# Patient Record
Sex: MALE | Race: WHITE | NOT HISPANIC OR LATINO | Employment: FULL TIME
[De-identification: names, ages, dates, MRNs, and addresses within clinical notes are randomized per-mention and may not be internally consistent; named-entity substitution may affect disease eponyms.]

---

## 2024-01-04 ENCOUNTER — TRANSCRIBE ORDERS (OUTPATIENT)
Dept: OTHER | Age: 64
End: 2024-01-04

## 2024-01-04 DIAGNOSIS — Z98.890 S/P ARTHROSCOPY OF RIGHT SHOULDER: Primary | ICD-10-CM

## 2024-01-05 ENCOUNTER — TRANSCRIBE ORDERS (OUTPATIENT)
Dept: OTHER | Age: 64
End: 2024-01-05

## 2024-01-29 ENCOUNTER — THERAPY VISIT (OUTPATIENT)
Dept: PHYSICAL THERAPY | Facility: CLINIC | Age: 64
End: 2024-01-29
Attending: PHYSICIAN ASSISTANT
Payer: COMMERCIAL

## 2024-01-29 DIAGNOSIS — Z96.611 AFTERCARE FOLLOWING RIGHT SHOULDER JOINT REPLACEMENT SURGERY: ICD-10-CM

## 2024-01-29 DIAGNOSIS — Z96.611 PRESENCE OF RIGHT ARTIFICIAL SHOULDER JOINT: ICD-10-CM

## 2024-01-29 DIAGNOSIS — Z47.1 AFTERCARE FOLLOWING RIGHT SHOULDER JOINT REPLACEMENT SURGERY: ICD-10-CM

## 2024-01-29 DIAGNOSIS — M25.511 ACUTE PAIN OF RIGHT SHOULDER: Primary | ICD-10-CM

## 2024-01-29 PROCEDURE — 97110 THERAPEUTIC EXERCISES: CPT | Mod: GP | Performed by: PHYSICAL THERAPIST

## 2024-01-29 PROCEDURE — 97161 PT EVAL LOW COMPLEX 20 MIN: CPT | Mod: GP | Performed by: PHYSICAL THERAPIST

## 2024-01-29 NOTE — PROGRESS NOTES
PHYSICAL THERAPY EVALUATION  Type of Visit: Evaluation    See electronic medical record for Abuse and Falls Screening details.    Subjective       Presenting condition or subjective complaint: pt reports he has had pain for about 5+ years and he's been monitoring his sx with yearly injections. He opted for TSA and he had that on 12-21-23 with Dr. Dixon with a 15 deg posterior augmentation and biceps tendon resection and transplantation.  Date of onset: 12/21/23    Relevant medical history:     Dates & types of surgery: cervical fusion in 2000    Prior diagnostic imaging/testing results: MRI; CT scan; X-ray     Prior therapy history for the same diagnosis, illness or injury: No          Living Environment  Social support: With a significant other or spouse   Type of home:     Stairs to enter the home:         Ramp:     Stairs inside the home:         Help at home: None  Equipment owned:       Employment: Yes   Hobbies/Interests: model airplanes    Patient goals for therapy: pain management, use it and get back to activities    Pain assessment: See objective evaluation for additional pain details     Objective   Pain:   Location: anterior, lateral, UT and posterior  At rest: 2-3/10  With activity: 4/10  Quality: dull ache, sharp,   Frequency: Intermittent  Time of Day: worse in night and sometimes in the am  Pain is exacerbated by: reaching overhead, behind back, lying on shoulder and sleeping  Pain is relieved by: rest, ice, anti-inflammatories     Range of Motion:     AROM L shoulder: 140/130/T7/85   AAROM R shoulder: table slide flexion=82, ABD in scaption=58, ER on wall=35     Strength:  L shoulder: WNL    R shoulder: NT  Notable mechanics: R scapular medial border prominence, winging and upward translation with abduction.   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  The following significant findings have been identified: Pain, Decreased ROM/flexibility, Decreased joint mobility, and Decreased strength. These impairments interfere with their ability to perform self care tasks, work tasks, recreational activities, household chores, and driving  as compared to previous level of function.     Clinical Decision Making (Complexity):  Clinical Presentation: Stable/Uncomplicated  Clinical Presentation Rationale: based on medical and personal factors listed in PT evaluation  Clinical Decision Making (Complexity): Low complexity    PLAN OF CARE  Treatment Interventions:  Interventions: Manual Therapy, Neuromuscular Re-education, Therapeutic Activity, Therapeutic Exercise    Long Term Goals     PT Goal 1  Goal Identifier: reaching  Goal Description: improve AROM and strength to tolerate reaching to the top of an [place] cabinet without pain  Rationale: to maximize safety and independence with performance of ADLs and functional tasks;to maximize safety and independence within the home;to maximize safety and independence within the community  Target Date: 04/22/24      Frequency of Treatment: 1x/wk x 6 weeks then every other week x 6 weeks  Duration of Treatment: 12 weeks    Recommended Referrals to Other Professionals: Physical Therapy  Education Assessment:        Risks and benefits of evaluation/treatment have been explained.   Patient/Family/caregiver agrees with Plan of Care.     Evaluation Time:     PT Eval, Low Complexity Minutes (98945): 20       Signing Clinician: Grace William PT

## 2024-02-05 ENCOUNTER — THERAPY VISIT (OUTPATIENT)
Dept: PHYSICAL THERAPY | Facility: CLINIC | Age: 64
End: 2024-02-05
Attending: PHYSICIAN ASSISTANT
Payer: COMMERCIAL

## 2024-02-05 DIAGNOSIS — Z96.611 AFTERCARE FOLLOWING RIGHT SHOULDER JOINT REPLACEMENT SURGERY: ICD-10-CM

## 2024-02-05 DIAGNOSIS — Z47.1 AFTERCARE FOLLOWING RIGHT SHOULDER JOINT REPLACEMENT SURGERY: ICD-10-CM

## 2024-02-05 DIAGNOSIS — Z96.611 PRESENCE OF RIGHT ARTIFICIAL SHOULDER JOINT: ICD-10-CM

## 2024-02-05 DIAGNOSIS — M25.511 ACUTE PAIN OF RIGHT SHOULDER: Primary | ICD-10-CM

## 2024-02-05 PROCEDURE — 97110 THERAPEUTIC EXERCISES: CPT | Mod: GP | Performed by: PHYSICAL THERAPIST

## 2024-02-12 ENCOUNTER — THERAPY VISIT (OUTPATIENT)
Dept: PHYSICAL THERAPY | Facility: CLINIC | Age: 64
End: 2024-02-12
Attending: PHYSICIAN ASSISTANT
Payer: COMMERCIAL

## 2024-02-12 DIAGNOSIS — Z96.611 PRESENCE OF RIGHT ARTIFICIAL SHOULDER JOINT: ICD-10-CM

## 2024-02-12 DIAGNOSIS — Z96.611 AFTERCARE FOLLOWING RIGHT SHOULDER JOINT REPLACEMENT SURGERY: ICD-10-CM

## 2024-02-12 DIAGNOSIS — Z47.1 AFTERCARE FOLLOWING RIGHT SHOULDER JOINT REPLACEMENT SURGERY: ICD-10-CM

## 2024-02-12 DIAGNOSIS — M25.511 ACUTE PAIN OF RIGHT SHOULDER: Primary | ICD-10-CM

## 2024-02-12 PROCEDURE — 97110 THERAPEUTIC EXERCISES: CPT | Mod: GP | Performed by: PHYSICAL THERAPIST

## 2024-02-21 ENCOUNTER — THERAPY VISIT (OUTPATIENT)
Dept: PHYSICAL THERAPY | Facility: CLINIC | Age: 64
End: 2024-02-21
Payer: COMMERCIAL

## 2024-02-21 DIAGNOSIS — M25.511 ACUTE PAIN OF RIGHT SHOULDER: Primary | ICD-10-CM

## 2024-02-21 DIAGNOSIS — Z96.611 AFTERCARE FOLLOWING RIGHT SHOULDER JOINT REPLACEMENT SURGERY: ICD-10-CM

## 2024-02-21 DIAGNOSIS — Z96.611 PRESENCE OF RIGHT ARTIFICIAL SHOULDER JOINT: ICD-10-CM

## 2024-02-21 DIAGNOSIS — Z47.1 AFTERCARE FOLLOWING RIGHT SHOULDER JOINT REPLACEMENT SURGERY: ICD-10-CM

## 2024-02-21 PROCEDURE — 97110 THERAPEUTIC EXERCISES: CPT | Mod: GP | Performed by: PHYSICAL THERAPIST

## 2024-02-28 ENCOUNTER — THERAPY VISIT (OUTPATIENT)
Dept: PHYSICAL THERAPY | Facility: CLINIC | Age: 64
End: 2024-02-28
Payer: COMMERCIAL

## 2024-02-28 DIAGNOSIS — M25.511 ACUTE PAIN OF RIGHT SHOULDER: Primary | ICD-10-CM

## 2024-02-28 DIAGNOSIS — Z47.1 AFTERCARE FOLLOWING RIGHT SHOULDER JOINT REPLACEMENT SURGERY: ICD-10-CM

## 2024-02-28 DIAGNOSIS — Z96.611 AFTERCARE FOLLOWING RIGHT SHOULDER JOINT REPLACEMENT SURGERY: ICD-10-CM

## 2024-02-28 DIAGNOSIS — Z96.611 PRESENCE OF RIGHT ARTIFICIAL SHOULDER JOINT: ICD-10-CM

## 2024-02-28 PROCEDURE — 97110 THERAPEUTIC EXERCISES: CPT | Mod: GP | Performed by: PHYSICAL THERAPIST

## 2024-03-06 ENCOUNTER — THERAPY VISIT (OUTPATIENT)
Dept: PHYSICAL THERAPY | Facility: CLINIC | Age: 64
End: 2024-03-06
Payer: COMMERCIAL

## 2024-03-06 DIAGNOSIS — M25.511 ACUTE PAIN OF RIGHT SHOULDER: Primary | ICD-10-CM

## 2024-03-06 DIAGNOSIS — Z47.1 AFTERCARE FOLLOWING RIGHT SHOULDER JOINT REPLACEMENT SURGERY: ICD-10-CM

## 2024-03-06 DIAGNOSIS — Z96.611 PRESENCE OF RIGHT ARTIFICIAL SHOULDER JOINT: ICD-10-CM

## 2024-03-06 DIAGNOSIS — Z96.611 AFTERCARE FOLLOWING RIGHT SHOULDER JOINT REPLACEMENT SURGERY: ICD-10-CM

## 2024-03-06 PROCEDURE — 97112 NEUROMUSCULAR REEDUCATION: CPT | Mod: GP | Performed by: PHYSICAL THERAPIST

## 2024-03-06 PROCEDURE — 97110 THERAPEUTIC EXERCISES: CPT | Mod: GP | Performed by: PHYSICAL THERAPIST

## 2024-03-19 ENCOUNTER — THERAPY VISIT (OUTPATIENT)
Dept: PHYSICAL THERAPY | Facility: CLINIC | Age: 64
End: 2024-03-19
Payer: COMMERCIAL

## 2024-03-19 DIAGNOSIS — Z96.611 PRESENCE OF RIGHT ARTIFICIAL SHOULDER JOINT: ICD-10-CM

## 2024-03-19 DIAGNOSIS — M25.511 ACUTE PAIN OF RIGHT SHOULDER: Primary | ICD-10-CM

## 2024-03-19 DIAGNOSIS — Z96.611 AFTERCARE FOLLOWING RIGHT SHOULDER JOINT REPLACEMENT SURGERY: ICD-10-CM

## 2024-03-19 DIAGNOSIS — Z47.1 AFTERCARE FOLLOWING RIGHT SHOULDER JOINT REPLACEMENT SURGERY: ICD-10-CM

## 2024-03-19 PROCEDURE — 97110 THERAPEUTIC EXERCISES: CPT | Mod: GP | Performed by: PHYSICAL THERAPIST

## 2024-04-01 ENCOUNTER — THERAPY VISIT (OUTPATIENT)
Dept: PHYSICAL THERAPY | Facility: CLINIC | Age: 64
End: 2024-04-01
Payer: COMMERCIAL

## 2024-04-01 DIAGNOSIS — Z96.611 PRESENCE OF RIGHT ARTIFICIAL SHOULDER JOINT: ICD-10-CM

## 2024-04-01 DIAGNOSIS — Z96.611 AFTERCARE FOLLOWING RIGHT SHOULDER JOINT REPLACEMENT SURGERY: ICD-10-CM

## 2024-04-01 DIAGNOSIS — Z47.1 AFTERCARE FOLLOWING RIGHT SHOULDER JOINT REPLACEMENT SURGERY: ICD-10-CM

## 2024-04-01 DIAGNOSIS — M25.511 ACUTE PAIN OF RIGHT SHOULDER: Primary | ICD-10-CM

## 2024-04-01 PROCEDURE — 97110 THERAPEUTIC EXERCISES: CPT | Mod: GP | Performed by: PHYSICAL THERAPIST

## 2024-04-01 PROCEDURE — 97112 NEUROMUSCULAR REEDUCATION: CPT | Mod: GP | Performed by: PHYSICAL THERAPIST

## 2024-04-01 NOTE — PROGRESS NOTES
04/01/24 0500   Appointment Info   Signing clinician's name / credentials Grace Saavedra DPT, SCS   Total/Authorized Visits 8 ([facility])   Visits Used 8   Medical Diagnosis s/p R TSA with 15 deg post augmentation and biceps resection and transplantation   PT Tx Diagnosis R shoulder pain   Precautions/Limitations sees Magan on 4-4 and Destiny on 6-24   Progress Note/Certification   Onset of illness/injury or Date of Surgery 12/21/23   Therapy Frequency 1x/wk x 6 weeks then every other week x 6 weeks   Predicted Duration 12 weeks   Progress Note Completed Date 04/01/24   PT Goal 1   Goal Identifier reaching   Goal Description improve AROM and strength to tolerate reaching to the top of an [place] cabinet without pain   Rationale to maximize safety and independence with performance of ADLs and functional tasks;to maximize safety and independence within the home;to maximize safety and independence within the community   Goal Progress can reach to lower shelf of [place] cabinet   Target Date 04/22/24   Subjective Report   Subjective Report he feels like anything below his shoulder is doing well--he does feel pretty tight in the am but the PT helps to loosen up his shoulder. He is working 10-11 hours days and is not doing any heavy lifting or reaching overhead but he is moving things around below his waist with 2 hands.   Objective Measures   Objective Measures Objective Measure 1   Objective Measure 1   Objective Measure R shoulder AAROM: wand abd=125, Ext/IR=central belt line, four corner pxdlvgz=171, supine wand flexion=not measure 3/19   Details R shoulder AROM: 110/120/L4/50   Treatment Interventions (PT)   Interventions Therapeutic Procedure/Exercise;Neuromuscular Re-education   Therapeutic Procedure/Exercise   PTRx Ther Proc 1 Cervical Retraction with Overpressure   PTRx Ther Proc 1 - Details HEP   PTRx Ther Proc 2 Pendulum/Codmans   PTRx Ther Proc 2 - Details HEP   PTRx Ther Proc 3 Four Corner Stretch Flexion   PTRx Ther  "Proc 3 - Details 10 x 5\"   PTRx Ther Proc 4 Wand Shoulder Flexion Supine   PTRx Ther Proc 4 - Details x15 cues to allow more support with L arm/hand   PTRx Ther Proc 5 Wand Shoulder Abduction Standing   PTRx Ther Proc 5 - Details x15 with cues to avoid shrug try thumb up   PTRx Ther Proc 6 Wand Shoulder Internal Rotation   PTRx Ther Proc 6 - Details 15   PTRx Ther Proc 7 Supine Ceiling Punch   PTRx Ther Proc 7 - Details 1 lb x 30   PTRx Ther Proc 8 Shoulder External Rotation Sidelying   PTRx Ther Proc 8 - Details AG x 20   PTRx Ther Proc 9 Shoulder Extension in Standing   PTRx Ther Proc 9 - Details 1# x 15   PTRx Ther Proc 10 Shoulder External Rotation Sidelying   PTRx Ther Proc 10 - Details AG x 20   PTRx Ther Proc 11 Shoulder Extension in Standing   PTRx Ther Proc 11 - Details AG x 20   Neuromuscular Re-education   PTRx Neuro Re-ed 1 Shoulder Flexion   PTRx Neuro Re-ed 1 - Details NMR for scap positioning and no UT hiking   PTRx Neuro Re-ed 2 Shoulder Scaption Full Can   PTRx Neuro Re-ed 2 - Details x15 for scap NMR not to hike the shoulder   Patient Response/Progress rob well, felt tension but not pain   PTRx Neuro Re-ed 3 Shoulder Theraband Low Row/Pulldown   PTRx Neuro Re-ed 3 - Details 15 with blue band   Plan   Home program see ptrx   Updates to plan of care transitioned from supine to standing cuff and added scap stab   Plan for next session continue with RC and scap strengthening         PLAN  Continue therapy per current plan of care.  1x/wk every other week for 8 weeks then transition to 1x/month for 2 months    Beginning/End Dates of Progress Note Reporting Period:  04/01/24 to 04/01/2024    Referring Provider:  Magan Mora    "

## 2024-04-15 ENCOUNTER — THERAPY VISIT (OUTPATIENT)
Dept: PHYSICAL THERAPY | Facility: CLINIC | Age: 64
End: 2024-04-15
Payer: COMMERCIAL

## 2024-04-15 DIAGNOSIS — Z96.611 PRESENCE OF RIGHT ARTIFICIAL SHOULDER JOINT: ICD-10-CM

## 2024-04-15 DIAGNOSIS — Z47.1 AFTERCARE FOLLOWING RIGHT SHOULDER JOINT REPLACEMENT SURGERY: ICD-10-CM

## 2024-04-15 DIAGNOSIS — Z96.611 AFTERCARE FOLLOWING RIGHT SHOULDER JOINT REPLACEMENT SURGERY: ICD-10-CM

## 2024-04-15 DIAGNOSIS — M25.511 ACUTE PAIN OF RIGHT SHOULDER: Primary | ICD-10-CM

## 2024-04-15 PROCEDURE — 97110 THERAPEUTIC EXERCISES: CPT | Mod: GP | Performed by: PHYSICAL THERAPIST

## 2024-04-15 PROCEDURE — 97112 NEUROMUSCULAR REEDUCATION: CPT | Mod: GP | Performed by: PHYSICAL THERAPIST

## 2024-04-29 ENCOUNTER — THERAPY VISIT (OUTPATIENT)
Dept: PHYSICAL THERAPY | Facility: CLINIC | Age: 64
End: 2024-04-29
Payer: COMMERCIAL

## 2024-04-29 DIAGNOSIS — Z96.611 PRESENCE OF RIGHT ARTIFICIAL SHOULDER JOINT: ICD-10-CM

## 2024-04-29 DIAGNOSIS — Z96.611 AFTERCARE FOLLOWING RIGHT SHOULDER JOINT REPLACEMENT SURGERY: ICD-10-CM

## 2024-04-29 DIAGNOSIS — M25.511 ACUTE PAIN OF RIGHT SHOULDER: Primary | ICD-10-CM

## 2024-04-29 DIAGNOSIS — Z47.1 AFTERCARE FOLLOWING RIGHT SHOULDER JOINT REPLACEMENT SURGERY: ICD-10-CM

## 2024-04-29 PROCEDURE — 97110 THERAPEUTIC EXERCISES: CPT | Mod: GP | Performed by: PHYSICAL THERAPIST

## 2024-04-29 PROCEDURE — 97112 NEUROMUSCULAR REEDUCATION: CPT | Mod: GP | Performed by: PHYSICAL THERAPIST

## 2024-05-14 ENCOUNTER — THERAPY VISIT (OUTPATIENT)
Dept: PHYSICAL THERAPY | Facility: CLINIC | Age: 64
End: 2024-05-14
Payer: COMMERCIAL

## 2024-05-14 DIAGNOSIS — Z47.1 AFTERCARE FOLLOWING RIGHT SHOULDER JOINT REPLACEMENT SURGERY: ICD-10-CM

## 2024-05-14 DIAGNOSIS — Z96.611 PRESENCE OF RIGHT ARTIFICIAL SHOULDER JOINT: ICD-10-CM

## 2024-05-14 DIAGNOSIS — Z96.611 AFTERCARE FOLLOWING RIGHT SHOULDER JOINT REPLACEMENT SURGERY: ICD-10-CM

## 2024-05-14 DIAGNOSIS — M25.511 ACUTE PAIN OF RIGHT SHOULDER: Primary | ICD-10-CM

## 2024-05-14 PROCEDURE — 97112 NEUROMUSCULAR REEDUCATION: CPT | Mod: GP | Performed by: PHYSICAL THERAPIST

## 2024-05-14 PROCEDURE — 97110 THERAPEUTIC EXERCISES: CPT | Mod: GP | Performed by: PHYSICAL THERAPIST

## 2024-05-30 ENCOUNTER — THERAPY VISIT (OUTPATIENT)
Dept: PHYSICAL THERAPY | Facility: CLINIC | Age: 64
End: 2024-05-30
Payer: COMMERCIAL

## 2024-05-30 DIAGNOSIS — Z96.611 PRESENCE OF RIGHT ARTIFICIAL SHOULDER JOINT: ICD-10-CM

## 2024-05-30 DIAGNOSIS — M25.511 ACUTE PAIN OF RIGHT SHOULDER: Primary | ICD-10-CM

## 2024-05-30 DIAGNOSIS — Z96.611 AFTERCARE FOLLOWING RIGHT SHOULDER JOINT REPLACEMENT SURGERY: ICD-10-CM

## 2024-05-30 DIAGNOSIS — Z47.1 AFTERCARE FOLLOWING RIGHT SHOULDER JOINT REPLACEMENT SURGERY: ICD-10-CM

## 2024-05-30 PROCEDURE — 97112 NEUROMUSCULAR REEDUCATION: CPT | Mod: GP | Performed by: PHYSICAL THERAPIST

## 2024-05-30 PROCEDURE — 97110 THERAPEUTIC EXERCISES: CPT | Mod: GP | Performed by: PHYSICAL THERAPIST

## 2024-05-30 NOTE — PROGRESS NOTES
"   05/30/24 0500   Appointment Info   Signing clinician's name / credentials Grace Saavedra DPT, SCS   Total/Authorized Visits 8 ([facility])   Visits Used 11   Medical Diagnosis s/p R TSA with 15 deg post augmentation and biceps resection and transplantation   PT Tx Diagnosis R shoulder pain   Precautions/Limitations sees Magan on 4-4 and Destiny on 6-24   Progress Note/Certification   Onset of illness/injury or Date of Surgery 12/21/23   Therapy Frequency every other week for 12 weeks   Predicted Duration 12 weeks   Progress Note Due Date 05/30/24   Progress Note Completed Date 04/01/24   PT Goal 1   Goal Identifier reaching   Goal Description new goal to reach to top shelf of [place] cabinet with 2-3# pain free without hiking   Rationale to maximize safety and independence with performance of ADLs and functional tasks;to maximize safety and independence within the home;to maximize safety and independence within the community   Goal Progress can reach to top shelf of [place] cabinet with 2# but hikes   Target Date 06/01/24   Date Met   (nearly met, just still hikes)   Subjective Report   Subjective Report He has full motion but he could feel his arm was weak when he tried to start the lawnmower. He feels like it used to hurt and he couldn't do it but now he feels like it was just a lack of strength now.   Objective Measures   Objective Measures Objective Measure 1   Objective Measure 1   Objective Measure R shoulder strength: 3+/4/4/4   Details R shoulder AROM: 127/135//L3/56   Treatment Interventions (PT)   Interventions Therapeutic Procedure/Exercise;Neuromuscular Re-education   Therapeutic Procedure/Exercise   Therapeutic Procedures: strength, endurance, ROM, flexibility minutes (78693) 25   PTRx Ther Proc 1 Cervical Retraction with Overpressure   PTRx Ther Proc 1 - Details HEP   PTRx Ther Proc 2 Four Corner Stretch Flexion   PTRx Ther Proc 2 - Details 10 x 5\"   PTRx Ther Proc 3 Wall Slides Abduction   PTRx Ther Proc 3 - " "Details 5 and he likes this better than the ABD wand he did 10 times   PTRx Ther Proc 4 Four Corner Stretch Internal Rotation   PTRx Ther Proc 4 - Details 10\" x 5   PTRx Ther Proc 5 Shoulder External Rotation Sidelying   PTRx Ther Proc 5 - Details 1/2# x 15   PTRx Ther Proc 6 Shoulder Extension in Standing   PTRx Ther Proc 6 - Details 3# x 20   PTRx Ther Proc 7 Bicep Curls with Dumbbells   PTRx Ther Proc 7 - Details 5# x 15   PTRx Ther Proc 8 Cable Tricep Extension Standing   PTRx Ther Proc 8 - Details rev   PTRx Ther Proc 9 Bent Over Rows   PTRx Ther Proc 9 - Details 10# x 15   Skilled Intervention see above   Patient Response/Progress rob well, no issues but did better when he was able to visualize with his scap   Neuromuscular Re-education   Neuromuscular re-ed of mvmt, balance, coord, kinesthetic sense, posture, proprioception minutes (43419) 13   PTRx Neuro Re-ed 1 Shoulder Flexion   PTRx Neuro Re-ed 1 - Details NMR not to hike shoulder 6 oz x 20 with back on wall to armpit height   PTRx Neuro Re-ed 2 Shoulder Scaption Full Can   PTRx Neuro Re-ed 2 - Details on wall only without weight focusing on NMR for scap positioning x 20 with 6 oz   PTRx Neuro Re-ed 3 Shoulder Theraband Low Row/Pulldown   PTRx Neuro Re-ed 3 - Details 25 with blue band   PTRx Neuro Re-ed 4 Shoulder Horizontal Abduction Standing   PTRx Neuro Re-ed 4 - Details x20 with NMR for scap ret and no UT hiking   PTRx Neuro Re-ed 5 Push-Up Plus At Counter   PTRx Neuro Re-ed 5 - Details x 20 with significant NMR for scap dep/ret   Skilled Intervention see above   Patient Response/Progress rob well, no pain   PTRx Neuro Re-ed 6 Shoulder Theraband Rows   PTRx Neuro Re-ed 6 - Details blue doubled up x 15   Plan   Home program see ptrx   Updates to plan of care added rows   Plan for next session add proprio         PLAN  Continue therapy per current plan of care.    Beginning/End Dates of Progress Note Reporting Period:  04/01/24 to " 05/30/2024    Referring Provider:  Magan Mora

## 2024-06-18 ENCOUNTER — THERAPY VISIT (OUTPATIENT)
Dept: PHYSICAL THERAPY | Facility: CLINIC | Age: 64
End: 2024-06-18
Payer: COMMERCIAL

## 2024-06-18 DIAGNOSIS — Z47.1 AFTERCARE FOLLOWING RIGHT SHOULDER JOINT REPLACEMENT SURGERY: ICD-10-CM

## 2024-06-18 DIAGNOSIS — Z96.611 PRESENCE OF RIGHT ARTIFICIAL SHOULDER JOINT: ICD-10-CM

## 2024-06-18 DIAGNOSIS — M25.511 ACUTE PAIN OF RIGHT SHOULDER: Primary | ICD-10-CM

## 2024-06-18 DIAGNOSIS — Z96.611 AFTERCARE FOLLOWING RIGHT SHOULDER JOINT REPLACEMENT SURGERY: ICD-10-CM

## 2024-06-18 PROCEDURE — 97110 THERAPEUTIC EXERCISES: CPT | Mod: GP | Performed by: PHYSICAL THERAPIST

## 2024-06-18 PROCEDURE — 97112 NEUROMUSCULAR REEDUCATION: CPT | Mod: GP | Performed by: PHYSICAL THERAPIST

## 2024-06-18 NOTE — PROGRESS NOTES
"   06/18/24 0500   Appointment Info   Signing clinician's name / credentials Grace Saavedra DPT, SCS   Total/Authorized Visits 8 ([facility])   Visits Used 12   Medical Diagnosis s/p R TSA with 15 deg post augmentation and biceps resection and transplantation   PT Tx Diagnosis R shoulder pain   Precautions/Limitations sees Magan on 4-4 and Destiny on 6-24   Progress Note/Certification   Onset of illness/injury or Date of Surgery 12/21/23   Therapy Frequency every other week for 12 weeks   Predicted Duration 12 weeks   Progress Note Due Date 05/30/24   Progress Note Completed Date 04/01/24   PT Goal 1   Goal Identifier reaching   Goal Description new goal to reach to top shelf of [place] cabinet with 2-3# pain free without hiking   Rationale to maximize safety and independence with performance of ADLs and functional tasks;to maximize safety and independence within the home;to maximize safety and independence within the community   Goal Progress still minimal hiking if over middle shelf of [place] cabinet   Target Date 06/01/24   Date Met 06/18/24   Subjective Report   Subjective Report He was able to do some \"pitting\" for his model airplane flying and even tried a little flying He did do his PT to stretch beforehand. He did feel some pain in the front of hte shoulder--more just achiness and had no pain afterwards. He is good not to push it too much. He feels easily 80% better with ADLS and with flying he feels he is not quite ready. He has good coworkers to help him at work but he doesn't feel any real restrictions with work.   Objective Measures   Objective Measures Objective Measure 1   Objective Measure 1   Objective Measure R shoulder strength: 3+/4+/4+/4+   Details R shoulder AROM: 130/138//L1/57   Treatment Interventions (PT)   Interventions Therapeutic Procedure/Exercise;Neuromuscular Re-education   Therapeutic Procedure/Exercise   Therapeutic Procedures: strength, endurance, ROM, flexibility minutes (41351) 25   PTRx Ther " "Proc 1 Cervical Retraction with Overpressure   PTRx Ther Proc 1 - Details HEP   PTRx Ther Proc 2 Four Corner Stretch Flexion   PTRx Ther Proc 2 - Details 10 x 5\"   PTRx Ther Proc 3 Wall Slides Abduction   PTRx Ther Proc 3 - Details 5 and he likes this better than the ABD wand he did 10 times   PTRx Ther Proc 4 Four Corner Stretch Internal Rotation   PTRx Ther Proc 4 - Details 10\" x 5   PTRx Ther Proc 5 Shoulder External Rotation Sidelying   PTRx Ther Proc 5 - Details 1/2# x 15   PTRx Ther Proc 6 Shoulder Extension in Standing   PTRx Ther Proc 6 - Details 3# x 20   PTRx Ther Proc 7 Bicep Curls with Dumbbells   PTRx Ther Proc 7 - Details 5# x 15   PTRx Ther Proc 8 Cable Tricep Extension Standing   PTRx Ther Proc 8 - Details rev   PTRx Ther Proc 9 Bent Over Rows   PTRx Ther Proc 9 - Details 10# x 15   Skilled Intervention see above   Patient Response/Progress rob well, no issues but did better when he was able to visualize with his scap   Neuromuscular Re-education   Neuromuscular re-ed of mvmt, balance, coord, kinesthetic sense, posture, proprioception minutes (78662) 10   Neuro Re-ed 1 ball on wall proprio   PTRx Neuro Re-ed 1 Shoulder Flexion   PTRx Neuro Re-ed 1 - Details NMR not to hike shoulder 6 oz x 20 with back on wall to armpit height   PTRx Neuro Re-ed 2 Shoulder Scaption Full Can   PTRx Neuro Re-ed 2 - Details on wall only without weight focusing on NMR for scap positioning x 20 with 6 oz   PTRx Neuro Re-ed 3 Shoulder Theraband Low Row/Pulldown   PTRx Neuro Re-ed 3 - Details 25 with blue band   PTRx Neuro Re-ed 4 Shoulder Horizontal Abduction Standing   PTRx Neuro Re-ed 4 - Details x20 with NMR for scap ret and no UT hiking   PTRx Neuro Re-ed 5 Push-Up Plus At Counter   PTRx Neuro Re-ed 5 - Details x 20 with significant NMR for scap dep/ret   PTRx Neuro Re-ed 6 Shoulder Theraband Rows   PTRx Neuro Re-ed 6 - Details blue doubled up x 15   Skilled Intervention see above   Patient Response/Progress rob well, " "no pain   Neuro Re-ed 1 - Details 45\" cw and ccw ea   Plan   Home program see ptrx   Updates to plan of care added proprioception   Plan for next session dc to home if MD agrees   Total Session Time   Timed Code Treatment Minutes 35   Total Treatment Time (sum of timed and untimed services) 35       PLAN  Continue therapy per current plan of care.    Beginning/End Dates of Progress Note Reporting Period:  04/01/24 to 06/18/2024    Referring Provider:  Magan Mora    "

## 2024-07-02 ENCOUNTER — THERAPY VISIT (OUTPATIENT)
Dept: PHYSICAL THERAPY | Facility: CLINIC | Age: 64
End: 2024-07-02
Payer: COMMERCIAL

## 2024-07-02 DIAGNOSIS — Z96.611 AFTERCARE FOLLOWING RIGHT SHOULDER JOINT REPLACEMENT SURGERY: ICD-10-CM

## 2024-07-02 DIAGNOSIS — Z47.1 AFTERCARE FOLLOWING RIGHT SHOULDER JOINT REPLACEMENT SURGERY: ICD-10-CM

## 2024-07-02 DIAGNOSIS — Z96.611 PRESENCE OF RIGHT ARTIFICIAL SHOULDER JOINT: ICD-10-CM

## 2024-07-02 DIAGNOSIS — M25.511 ACUTE PAIN OF RIGHT SHOULDER: Primary | ICD-10-CM

## 2024-07-02 PROCEDURE — 97140 MANUAL THERAPY 1/> REGIONS: CPT | Mod: GP | Performed by: PHYSICAL THERAPIST

## 2024-07-02 PROCEDURE — 97110 THERAPEUTIC EXERCISES: CPT | Mod: GP | Performed by: PHYSICAL THERAPIST

## 2024-07-16 ENCOUNTER — THERAPY VISIT (OUTPATIENT)
Dept: PHYSICAL THERAPY | Facility: CLINIC | Age: 64
End: 2024-07-16
Payer: COMMERCIAL

## 2024-07-16 DIAGNOSIS — Z47.1 AFTERCARE FOLLOWING RIGHT SHOULDER JOINT REPLACEMENT SURGERY: ICD-10-CM

## 2024-07-16 DIAGNOSIS — Z96.611 PRESENCE OF RIGHT ARTIFICIAL SHOULDER JOINT: ICD-10-CM

## 2024-07-16 DIAGNOSIS — M25.511 ACUTE PAIN OF RIGHT SHOULDER: Primary | ICD-10-CM

## 2024-07-16 DIAGNOSIS — Z96.611 AFTERCARE FOLLOWING RIGHT SHOULDER JOINT REPLACEMENT SURGERY: ICD-10-CM

## 2024-07-16 PROCEDURE — 97110 THERAPEUTIC EXERCISES: CPT | Mod: GP | Performed by: PHYSICAL THERAPIST

## 2024-07-16 PROCEDURE — 97112 NEUROMUSCULAR REEDUCATION: CPT | Mod: GP | Performed by: PHYSICAL THERAPIST

## 2024-08-11 ENCOUNTER — HEALTH MAINTENANCE LETTER (OUTPATIENT)
Age: 64
End: 2024-08-11

## 2025-01-07 PROBLEM — M25.511 ACUTE PAIN OF RIGHT SHOULDER: Status: RESOLVED | Noted: 2024-01-29 | Resolved: 2025-01-07

## 2025-01-07 PROBLEM — Z96.611 PRESENCE OF RIGHT ARTIFICIAL SHOULDER JOINT: Status: RESOLVED | Noted: 2024-01-29 | Resolved: 2025-01-07

## 2025-01-07 PROBLEM — Z96.611 AFTERCARE FOLLOWING RIGHT SHOULDER JOINT REPLACEMENT SURGERY: Status: RESOLVED | Noted: 2024-01-29 | Resolved: 2025-01-07

## 2025-01-07 PROBLEM — Z47.1 AFTERCARE FOLLOWING RIGHT SHOULDER JOINT REPLACEMENT SURGERY: Status: RESOLVED | Noted: 2024-01-29 | Resolved: 2025-01-07

## 2025-07-06 ENCOUNTER — HEALTH MAINTENANCE LETTER (OUTPATIENT)
Age: 65
End: 2025-07-06